# Patient Record
Sex: MALE | Race: WHITE | NOT HISPANIC OR LATINO | Employment: FULL TIME | URBAN - NONMETROPOLITAN AREA
[De-identification: names, ages, dates, MRNs, and addresses within clinical notes are randomized per-mention and may not be internally consistent; named-entity substitution may affect disease eponyms.]

---

## 2017-11-29 ENCOUNTER — OFFICE VISIT (OUTPATIENT)
Dept: INTERNAL MEDICINE | Facility: CLINIC | Age: 52
End: 2017-11-29

## 2017-11-29 VITALS
HEART RATE: 68 BPM | DIASTOLIC BLOOD PRESSURE: 80 MMHG | SYSTOLIC BLOOD PRESSURE: 120 MMHG | WEIGHT: 307 LBS | TEMPERATURE: 97.4 F | RESPIRATION RATE: 14 BRPM | OXYGEN SATURATION: 97 % | HEIGHT: 72 IN | BODY MASS INDEX: 41.58 KG/M2

## 2017-11-29 DIAGNOSIS — H69.91 DISORDER OF RIGHT EUSTACHIAN TUBE: Primary | ICD-10-CM

## 2017-11-29 DIAGNOSIS — H66.91 RIGHT OTITIS MEDIA, UNSPECIFIED OTITIS MEDIA TYPE: ICD-10-CM

## 2017-11-29 DIAGNOSIS — H93.11 TINNITUS OF RIGHT EAR: ICD-10-CM

## 2017-11-29 PROCEDURE — 99213 OFFICE O/P EST LOW 20 MIN: CPT | Performed by: INTERNAL MEDICINE

## 2017-11-29 RX ORDER — AZITHROMYCIN 250 MG/1
TABLET, FILM COATED ORAL
Qty: 6 TABLET | Refills: 0 | Status: SHIPPED | OUTPATIENT
Start: 2017-11-29 | End: 2019-06-17

## 2017-11-29 RX ORDER — FLUTICASONE PROPIONATE 50 MCG
2 SPRAY, SUSPENSION (ML) NASAL DAILY
Qty: 1 BOTTLE | Refills: 1 | Status: SHIPPED | OUTPATIENT
Start: 2017-11-29 | End: 2019-06-17

## 2017-11-29 RX ORDER — CARVEDILOL 12.5 MG/1
TABLET ORAL
COMMUNITY
Start: 2014-10-16 | End: 2019-07-22

## 2017-11-29 RX ORDER — PREDNISONE 20 MG/1
20 TABLET ORAL DAILY
Qty: 10 TABLET | Refills: 0 | Status: SHIPPED | OUTPATIENT
Start: 2017-11-29 | End: 2019-06-17

## 2017-11-29 RX ORDER — AMLODIPINE BESYLATE AND BENAZEPRIL HYDROCHLORIDE 10; 40 MG/1; MG/1
CAPSULE ORAL
COMMUNITY
Start: 2017-11-22 | End: 2019-06-17 | Stop reason: SDUPTHER

## 2017-11-29 NOTE — PROGRESS NOTES
Subjective   Samantha Silva is a 52 y.o. male.     Chief Complaint   Patient presents with   • Earache     right ear pain - ringing in ears as well       Earache    There is pain in the right ear. This is a new problem. The current episode started in the past 7 days. The problem occurs constantly. The problem has been unchanged. There has been no fever. The pain is mild. Associated symptoms include a sore throat. Pertinent negatives include no coughing, ear discharge or rhinorrhea. Associated symptoms comments: Ringing in ear. He has tried NSAIDs for the symptoms. The treatment provided no relief.          Current Outpatient Prescriptions:   •  amLODIPine-benazepril (LOTREL) 10-40 MG per capsule, , Disp: , Rfl:   •  carvedilol (COREG) 12.5 MG tablet, Take  by mouth., Disp: , Rfl:   •  azithromycin (ZITHROMAX) 250 MG tablet, Take 2 tablets the first day, then 1 tablet daily for 4 days., Disp: 6 tablet, Rfl: 0  •  fluticasone (FLONASE) 50 MCG/ACT nasal spray, 2 sprays into each nostril Daily., Disp: 1 bottle, Rfl: 1  •  predniSONE (DELTASONE) 20 MG tablet, Take 1 tablet by mouth Daily., Disp: 10 tablet, Rfl: 0    The following portions of the patient's history were reviewed and updated as appropriate: allergies, current medications, past family history, past medical history, past social history, past surgical history and problem list.    Review of Systems   Constitutional: Negative.    HENT: Positive for ear pain and sore throat. Negative for ear discharge and rhinorrhea.    Respiratory: Negative.  Negative for cough.    Cardiovascular: Negative.    Gastrointestinal: Negative.    Skin: Negative.    Psychiatric/Behavioral: Negative.        Objective   Physical Exam   Constitutional: He is oriented to person, place, and time. He appears well-developed and well-nourished.   Neck: Neck supple.   Cardiovascular: Normal rate, regular rhythm and normal heart sounds.    Pulmonary/Chest: Effort normal and breath sounds  normal.   Abdominal: Soft. Bowel sounds are normal.   Neurological: He is alert and oriented to person, place, and time.   Psychiatric: He has a normal mood and affect. His behavior is normal.       All tests have been reviewed.    Assessment/Plan   Diagnoses and all orders for this visit:    Disorder of right eustachian tube    OM    Tinnitus of right ear    -     predniSONE (DELTASONE) 20 MG tablet; Take 1 tablet by mouth Daily.  -     fluticasone (FLONASE) 50 MCG/ACT nasal spray; 2 sprays into each nostril Daily.  -     azithromycin (ZITHROMAX) 250 MG tablet; Take 2 tablets the first day, then 1 tablet daily for 4 days.           zyrtec and advil

## 2019-06-17 ENCOUNTER — OFFICE VISIT (OUTPATIENT)
Dept: INTERNAL MEDICINE | Facility: CLINIC | Age: 54
End: 2019-06-17

## 2019-06-17 VITALS
HEIGHT: 70 IN | WEIGHT: 250 LBS | HEART RATE: 68 BPM | OXYGEN SATURATION: 97 % | TEMPERATURE: 97.9 F | SYSTOLIC BLOOD PRESSURE: 116 MMHG | DIASTOLIC BLOOD PRESSURE: 80 MMHG | BODY MASS INDEX: 35.79 KG/M2

## 2019-06-17 DIAGNOSIS — E55.9 VITAMIN D DEFICIENCY: ICD-10-CM

## 2019-06-17 DIAGNOSIS — R63.4 WEIGHT LOSS: ICD-10-CM

## 2019-06-17 DIAGNOSIS — E66.9 CLASS 2 OBESITY WITHOUT SERIOUS COMORBIDITY WITH BODY MASS INDEX (BMI) OF 35.0 TO 35.9 IN ADULT, UNSPECIFIED OBESITY TYPE: ICD-10-CM

## 2019-06-17 DIAGNOSIS — R35.1 NOCTURIA: ICD-10-CM

## 2019-06-17 DIAGNOSIS — Z12.11 COLON CANCER SCREENING: ICD-10-CM

## 2019-06-17 DIAGNOSIS — I10 ESSENTIAL HYPERTENSION: Primary | ICD-10-CM

## 2019-06-17 DIAGNOSIS — G47.30 SLEEP APNEA, UNSPECIFIED TYPE: ICD-10-CM

## 2019-06-17 PROBLEM — H69.91 DISORDER OF RIGHT EUSTACHIAN TUBE: Status: RESOLVED | Noted: 2017-11-29 | Resolved: 2019-06-17

## 2019-06-17 PROBLEM — H66.91 RIGHT OTITIS MEDIA: Status: RESOLVED | Noted: 2017-11-29 | Resolved: 2019-06-17

## 2019-06-17 PROBLEM — H93.11 TINNITUS OF RIGHT EAR: Status: RESOLVED | Noted: 2017-11-29 | Resolved: 2019-06-17

## 2019-06-17 PROCEDURE — 99214 OFFICE O/P EST MOD 30 MIN: CPT | Performed by: INTERNAL MEDICINE

## 2019-06-17 RX ORDER — AMLODIPINE BESYLATE AND BENAZEPRIL HYDROCHLORIDE 10; 40 MG/1; MG/1
1 CAPSULE ORAL DAILY
Qty: 90 CAPSULE | Refills: 3 | Status: SHIPPED | OUTPATIENT
Start: 2019-06-17 | End: 2020-07-09 | Stop reason: SDUPTHER

## 2019-06-17 NOTE — PROGRESS NOTES
Subjective   Samantha Silva is a 54 y.o. male.     Chief Complaint   Patient presents with   • Follow-up     has not been seen since 2017       History of Present Illness   Patient here for follow-up of.  Recently lost 67 pound over 6 months patient is a blood pressure starting to be low.  Mild fatigue dizzy patient self discontinued after now carvedilol 12.5 mg.  Sleep apnea on CPAP patient wants to be seen by a sleep doctor again.  Patient is also due for colonoscopy.  History of a low vitamin D high cholesterol needs to be checked.    Current Outpatient Medications:   •  amLODIPine-benazepril (LOTREL) 10-40 MG per capsule, Take 1 capsule by mouth Daily., Disp: 90 capsule, Rfl: 3  •  carvedilol (COREG) 12.5 MG tablet, Take  by mouth., Disp: , Rfl:     The following portions of the patient's history were reviewed and updated as appropriate: allergies, current medications, past family history, past medical history, past social history, past surgical history and problem list.    Review of Systems   Constitutional: Negative.    Respiratory: Negative.    Cardiovascular: Negative.    Gastrointestinal: Negative.    Musculoskeletal: Negative.    Skin: Negative.    Neurological: Negative.    Psychiatric/Behavioral: Negative.        Objective   Physical Exam   Constitutional: He is oriented to person, place, and time. He appears well-developed and well-nourished.   Neck: Neck supple.   Cardiovascular: Normal rate, regular rhythm and normal heart sounds.   Pulmonary/Chest: Effort normal and breath sounds normal.   Abdominal: Bowel sounds are normal.   Neurological: He is alert and oriented to person, place, and time.   Skin: Skin is warm.   Psychiatric: He has a normal mood and affect.       All tests have been reviewed.    Assessment/Plan   Diagnoses and all orders for this visit:    Essential hypertension wean off coreg , continue lotrel   Weight loss 67Ib on purpose, diet continue and do labs  Class 2 obesity without  serious comorbidity with body mass index (BMI) of 35.0 to 35.9 in adult, unspecified obesity type  Colon ca screen do scope  Sleep apnea on cpap okay ICD 3 weeks before you come back to do all the blood tests, unspecified type ref to pulm   Hx of Lumbar disc herniation doing well  Dyslipidemia do lab  vitD low,  do lab  [id number], tdap today  3 weeks physical  After labs

## 2019-07-22 ENCOUNTER — OFFICE VISIT (OUTPATIENT)
Dept: GASTROENTEROLOGY | Facility: CLINIC | Age: 54
End: 2019-07-22

## 2019-07-22 VITALS
BODY MASS INDEX: 36.79 KG/M2 | RESPIRATION RATE: 18 BRPM | HEART RATE: 72 BPM | HEIGHT: 70 IN | SYSTOLIC BLOOD PRESSURE: 126 MMHG | WEIGHT: 257 LBS | TEMPERATURE: 97.9 F | DIASTOLIC BLOOD PRESSURE: 72 MMHG

## 2019-07-22 DIAGNOSIS — Z12.11 COLON CANCER SCREENING: Primary | ICD-10-CM

## 2019-07-22 PROBLEM — M51.36 BULGE OF LUMBAR DISC WITHOUT MYELOPATHY: Status: ACTIVE | Noted: 2019-07-22

## 2019-07-22 PROCEDURE — S0285 CNSLT BEFORE SCREEN COLONOSC: HCPCS | Performed by: NURSE PRACTITIONER

## 2019-07-22 RX ORDER — SODIUM CHLORIDE 9 MG/ML
70 INJECTION, SOLUTION INTRAVENOUS CONTINUOUS PRN
Status: CANCELLED | OUTPATIENT
Start: 2019-07-22

## 2019-07-22 NOTE — PATIENT INSTRUCTIONS
1. The patient denies recent change in bowel habits. There is no diarrhea or constipation. There is no history of abdominal pain. There is no history of overt GI bleed (hematemesis melena or hematochezia). The patient denies nausea or vomiting. There is no history of reflux. The patient denies dysphagia or odynophagia. There is no history of recent significant weight loss. There is no history of liver disease in the past. There is no family history of colon cancer. The patient has not had a colonoscopy in the past.

## 2019-07-22 NOTE — PROGRESS NOTES
Chief Complaint   Patient presents with   • Establish Care     Colon Screen      History of Present Illness     The patient denies recent change in bowel habits. There is no diarrhea or constipation. There is no history of abdominal pain. There is no history of overt GI bleed (hematemesis melena or hematochezia). The patient denies nausea or vomiting. There is no history of reflux. The patient denies dysphagia or odynophagia. There is no history of recent significant weight loss. There is no history of liver disease in the past. There is no family history of colon cancer. The patient has not had a colonoscopy in the past.    Review of Systems   Constitutional: Negative for appetite change, chills, fatigue, fever and unexpected weight change.   HENT: Negative for mouth sores, nosebleeds and trouble swallowing.    Eyes: Negative for discharge and redness.   Respiratory: Negative for apnea, cough and shortness of breath.    Cardiovascular: Negative for chest pain, palpitations and leg swelling.   Gastrointestinal: Negative for abdominal distention, abdominal pain, anal bleeding, blood in stool, constipation, diarrhea, nausea and vomiting.   Endocrine: Negative for cold intolerance, heat intolerance and polydipsia.   Genitourinary: Negative for dysuria, hematuria and urgency.   Musculoskeletal: Negative for arthralgias, joint swelling and myalgias.   Skin: Negative for rash.   Allergic/Immunologic: Negative for food allergies and immunocompromised state.   Neurological: Negative for dizziness, seizures, syncope and headaches.   Hematological: Negative for adenopathy. Does not bruise/bleed easily.   Psychiatric/Behavioral: Negative for dysphoric mood. The patient is not nervous/anxious and is not hyperactive.      Patient Active Problem List   Diagnosis   • Essential hypertension   • Class 2 obesity without serious comorbidity with body mass index (BMI) of 35.0 to 35.9 in adult   • Sleep apnea   • Bulge of lumbar disc  "without myelopathy     Past Medical History:   Diagnosis Date   • CPAP (continuous positive airway pressure) dependence    • Hypertension    • Sleep apnea      Past Surgical History:   Procedure Laterality Date   • LAPAROSCOPIC GASTRIC BANDING     • SPINE SURGERY       Family History   Problem Relation Age of Onset   • Heart attack Brother    • Colon cancer Neg Hx    • Liver disease Neg Hx      Social History     Tobacco Use   • Smoking status: Former Smoker     Last attempt to quit:      Years since quittin.5   • Smokeless tobacco: Never Used   Substance Use Topics   • Alcohol use: Yes     Comment: social        Current Outpatient Medications:   •  amLODIPine-benazepril (LOTREL) 10-40 MG per capsule, Take 1 capsule by mouth Daily., Disp: 90 capsule, Rfl: 3    No Known Allergies    Blood pressure 126/72, pulse 72, temperature 97.9 °F (36.6 °C), resp. rate 18, height 177.8 cm (70\"), weight 117 kg (257 lb).    Physical Exam   Constitutional: He is oriented to person, place, and time. He appears well-developed and well-nourished. No distress.   HENT:   Head: Normocephalic and atraumatic.   Right Ear: Hearing and external ear normal.   Left Ear: Hearing and external ear normal.   Nose: Nose normal.   Mouth/Throat: Oropharynx is clear and moist and mucous membranes are normal. Mucous membranes are not pale, not dry and not cyanotic. No oral lesions. No oropharyngeal exudate.   Eyes: Conjunctivae and EOM are normal. Right eye exhibits no discharge. Left eye exhibits no discharge.   Neck: Trachea normal. Neck supple. No JVD present. No edema present. No thyroid mass and no thyromegaly present.   Cardiovascular: Normal rate, regular rhythm, S2 normal and normal heart sounds. Exam reveals no gallop, no S3 and no friction rub.   No murmur heard.  Pulmonary/Chest: Effort normal and breath sounds normal. No respiratory distress. He exhibits no tenderness.   Abdominal: Normal appearance and bowel sounds are " normal. He exhibits no distension, no ascites and no mass. There is no splenomegaly or hepatomegaly. There is no tenderness. There is no rigidity, no rebound and no guarding. No hernia.     Vascular Status -  His right foot exhibits no edema. His left foot exhibits no edema.  Lymphadenopathy:     He has no cervical adenopathy.        Left: No supraclavicular adenopathy present.   Neurological: He is alert and oriented to person, place, and time. He has normal strength. No cranial nerve deficit or sensory deficit.   Skin: No rash noted. He is not diaphoretic. No cyanosis. No pallor. Nails show no clubbing.   Psychiatric: He has a normal mood and affect.   Nursing note and vitals reviewed.  Stigmata of chronic liver disease:  None.  Asterixis:  None.    Assessment:      ICD-10-CM ICD-9-CM   1. Colon cancer screening Z12.11 V76.51       Plan/  Patient Instructions   1. The patient denies recent change in bowel habits. There is no diarrhea or constipation. There is no history of abdominal pain. There is no history of overt GI bleed (hematemesis melena or hematochezia). The patient denies nausea or vomiting. There is no history of reflux. The patient denies dysphagia or odynophagia. There is no history of recent significant weight loss. There is no history of liver disease in the past. There is no family history of colon cancer. The patient has not had a colonoscopy in the past.     Yanelis Cuevas, APRN

## 2019-07-23 PROBLEM — Z12.11 COLON CANCER SCREENING: Status: ACTIVE | Noted: 2019-07-23

## 2019-08-27 ENCOUNTER — OFFICE VISIT (OUTPATIENT)
Dept: PULMONOLOGY | Facility: CLINIC | Age: 54
End: 2019-08-27

## 2019-08-27 VITALS
SYSTOLIC BLOOD PRESSURE: 122 MMHG | BODY MASS INDEX: 35.21 KG/M2 | HEIGHT: 72 IN | OXYGEN SATURATION: 98 % | WEIGHT: 260 LBS | HEART RATE: 72 BPM | RESPIRATION RATE: 16 BRPM | DIASTOLIC BLOOD PRESSURE: 72 MMHG

## 2019-08-27 DIAGNOSIS — E66.9 OBESITY (BMI 30-39.9): ICD-10-CM

## 2019-08-27 DIAGNOSIS — G47.33 OBSTRUCTIVE SLEEP APNEA: Primary | ICD-10-CM

## 2019-08-27 PROCEDURE — 99244 OFF/OP CNSLTJ NEW/EST MOD 40: CPT | Performed by: INTERNAL MEDICINE

## 2019-08-27 NOTE — PROGRESS NOTES
CONSULT NOTE    Requested by:   Xu Garza MD      Chief Complaint   Patient presents with   • Consult   • Sleeping Problem       Subjective:  Samantha Silva is a 54 y.o. male.     History of Present Illness   Patient comes in today for consultation because of sleep apnea.  The patient says that  he was diagnosed with sleep apnea 12 years ago.  It appears that he has been on a PAP device since then.    Patient does report improvement but occasionally feels that he is feeling tired in the morning and occasionally has noticed excessive daytime sleepiness as well.  The patient denies headaches in the mornings.     Patient is not aware of snoring through the device.     Patient says that the compliance with the use of the equipment is good and he reports some issues with the mask such as leaks etc.     he however, does not feel that all his symptoms from before the diagnosis being established have returned although he is noticing renewed tendency to feel sleepy while watching TV.    He still drinks 10 servings of coffee a day. He also has multiple family members with SANA.     The following portions of the patient's history were reviewed and updated as appropriate: allergies, current medications, past family history, past medical history, past social history and past surgical history.    Review of Systems   Constitutional: Negative for chills, fatigue and fever.   HENT: Negative for sinus pressure, sneezing and sore throat.    Respiratory: Negative for cough, chest tightness, shortness of breath and wheezing.    Cardiovascular: Negative for palpitations and leg swelling.   Psychiatric/Behavioral: Positive for sleep disturbance.   All other systems reviewed and are negative.      Past Medical History:   Diagnosis Date   • CPAP (continuous positive airway pressure) dependence    • History of echocardiogram    • History of stress test    • Hypertension 2005   • Obesity    • Sleep apnea 2008   • Wears glasses   "      Social History     Tobacco Use   • Smoking status: Former Smoker     Last attempt to quit:      Years since quittin.6   • Smokeless tobacco: Never Used   Substance Use Topics   • Alcohol use: No     Frequency: Never     Comment: social          Objective:  Visit Vitals  /72   Pulse 72   Resp 16   Ht 182.9 cm (72.01\")   Wt 118 kg (260 lb)   SpO2 98%   BMI 35.25 kg/m²       Physical Exam   Constitutional: He is oriented to person, place, and time. He appears well-developed and well-nourished.   HENT:   Head: Atraumatic.   Crowded Oropharynx.    Eyes: EOM are normal. Pupils are equal, round, and reactive to light.   Neck: No JVD present. No tracheal deviation present. No thyromegaly present.   Increased adipose tissue.    Cardiovascular: Normal rate and regular rhythm.   Pulmonary/Chest: Effort normal and breath sounds normal. No respiratory distress. He has no wheezes.   Musculoskeletal: Normal range of motion. He exhibits no edema.   Gait was normal.   Neurological: He is alert and oriented to person, place, and time.   Skin: Skin is warm and dry.   Psychiatric: He has a normal mood and affect. His behavior is normal.   Vitals reviewed.      Assessment/Plan:  Samantha was seen today for consult and sleeping problem.    Diagnoses and all orders for this visit:    Obstructive sleep apnea  -     BIPAP / CPAP Adjustment    Obesity (BMI 30-39.9)  -     BIPAP / CPAP Adjustment        Return in about 3 months (around 2019) for [facility].    DISCUSSION(if any):  Laboratory workup was also reviewed which showed   Lab Results   Component Value Date    CO2 28 10/16/2014     ===========================  ===========================    We will try to obtain his last sleep study results from the performing facility, if not already scanned in our system.    I told the patient that his symptoms are consistent with partially controlled sleep apnea.    Since his last sleep study was more than 10 years " ago, I have offered him the choice of a trial of AutoPap. He will definitely need a new device.     If the symptoms do not improve, even after trying the AutoPap empirically, the patient may have to undergo a full night titration study or a split study.    Patient was advised to continue using PAP for at least 4 hours every night    Patient was advised to call this office with any issues.    Since the patient is an avid traveler and goes on long bike trips, I told him that he can ask the DME company to provide him with a travel CPAP.    The patient says that he is willing to pay out-of-pocket for the portable travel CPAP.    Weight loss was also discussed and advised.    He was advised to cut back on his caffeine.     Dictated utilizing Dragon dictation.    This document was electronically signed by Rachna Scott MD on 08/27/19 at 10:40 AM

## 2019-08-28 ENCOUNTER — ANESTHESIA (OUTPATIENT)
Dept: GASTROENTEROLOGY | Facility: HOSPITAL | Age: 54
End: 2019-08-28

## 2019-08-28 ENCOUNTER — ANESTHESIA EVENT (OUTPATIENT)
Dept: GASTROENTEROLOGY | Facility: HOSPITAL | Age: 54
End: 2019-08-28

## 2019-08-28 ENCOUNTER — HOSPITAL ENCOUNTER (OUTPATIENT)
Facility: HOSPITAL | Age: 54
Setting detail: HOSPITAL OUTPATIENT SURGERY
Discharge: HOME | End: 2019-08-28
Attending: INTERNAL MEDICINE | Admitting: INTERNAL MEDICINE

## 2019-08-28 VITALS
HEIGHT: 72 IN | OXYGEN SATURATION: 99 % | SYSTOLIC BLOOD PRESSURE: 112 MMHG | WEIGHT: 257 LBS | TEMPERATURE: 97.8 F | HEART RATE: 59 BPM | RESPIRATION RATE: 18 BRPM | BODY MASS INDEX: 34.81 KG/M2 | DIASTOLIC BLOOD PRESSURE: 72 MMHG

## 2019-08-28 DIAGNOSIS — Z12.11 COLON CANCER SCREENING: ICD-10-CM

## 2019-08-28 PROCEDURE — 25010000002 PROPOFOL 1000 MG/100ML EMULSION: Performed by: NURSE ANESTHETIST, CERTIFIED REGISTERED

## 2019-08-28 PROCEDURE — S0260 H&P FOR SURGERY: HCPCS | Performed by: INTERNAL MEDICINE

## 2019-08-28 PROCEDURE — 25010000002 FENTANYL CITRATE (PF) 100 MCG/2ML SOLUTION: Performed by: NURSE ANESTHETIST, CERTIFIED REGISTERED

## 2019-08-28 PROCEDURE — 25010000002 PROPOFOL 1000 MG/ML EMULSION: Performed by: NURSE ANESTHETIST, CERTIFIED REGISTERED

## 2019-08-28 PROCEDURE — 45380 COLONOSCOPY AND BIOPSY: CPT | Performed by: INTERNAL MEDICINE

## 2019-08-28 RX ORDER — SODIUM CHLORIDE 0.9 % (FLUSH) 0.9 %
10 SYRINGE (ML) INJECTION AS NEEDED
Status: DISCONTINUED | OUTPATIENT
Start: 2019-08-28 | End: 2019-08-28 | Stop reason: HOSPADM

## 2019-08-28 RX ORDER — PROPOFOL 10 MG/ML
INJECTION, EMULSION INTRAVENOUS AS NEEDED
Status: DISCONTINUED | OUTPATIENT
Start: 2019-08-28 | End: 2019-08-28 | Stop reason: SURG

## 2019-08-28 RX ORDER — FENTANYL CITRATE 50 UG/ML
INJECTION, SOLUTION INTRAMUSCULAR; INTRAVENOUS AS NEEDED
Status: DISCONTINUED | OUTPATIENT
Start: 2019-08-28 | End: 2019-08-28 | Stop reason: SURG

## 2019-08-28 RX ORDER — SODIUM CHLORIDE 9 MG/ML
70 INJECTION, SOLUTION INTRAVENOUS CONTINUOUS PRN
Status: DISCONTINUED | OUTPATIENT
Start: 2019-08-28 | End: 2019-08-28 | Stop reason: HOSPADM

## 2019-08-28 RX ADMIN — FENTANYL CITRATE 100 MCG: 50 INJECTION, SOLUTION INTRAMUSCULAR; INTRAVENOUS at 09:00

## 2019-08-28 RX ADMIN — SODIUM CHLORIDE 70 ML/HR: 9 INJECTION, SOLUTION INTRAVENOUS at 08:00

## 2019-08-28 RX ADMIN — PROPOFOL 50 MG: 10 INJECTION, EMULSION INTRAVENOUS at 09:00

## 2019-08-28 RX ADMIN — PROPOFOL 140 MCG/KG/MIN: 10 INJECTION, EMULSION INTRAVENOUS at 09:00

## 2019-08-28 NOTE — ANESTHESIA PREPROCEDURE EVALUATION
Anesthesia Evaluation     NPO Solid Status: > 8 hours  NPO Liquid Status: > 8 hours           Airway   Mallampati: II  TM distance: >3 FB  Neck ROM: full  No difficulty expected  Dental - normal exam     Pulmonary - normal exam   (+) sleep apnea on CPAP,   Cardiovascular - normal exam    (+) hypertension well controlled less than 2 medications,       Neuro/Psych  GI/Hepatic/Renal/Endo    (+) obesity, morbid obesity,      Musculoskeletal     Abdominal  - normal exam   Substance History      OB/GYN          Other                        Anesthesia Plan    ASA 3     MAC     intravenous induction   Anesthetic plan, all risks, benefits, and alternatives have been provided, discussed and informed consent has been obtained with: patient.    Plan discussed with CRNA.

## 2019-08-28 NOTE — ANESTHESIA POSTPROCEDURE EVALUATION
Patient: Samantha Simmonschurch    Procedure Summary     Date:  08/28/19 Room / Location:  [facility] ENDOSCOPY 2 / [facility] ENDOSCOPY    Anesthesia Start:  0902 Anesthesia Stop:  1014    Procedure:  COLONOSCOPY WITH COLD FORCEP POLYPECTOMY (N/A Anus) Diagnosis:       Colon cancer screening      (Colon cancer screening [Z12.11])    Surgeon:  Дмитрий Jasso MD Provider:  Fco Fischer CRNA    Anesthesia Type:  MAC ASA Status:  3          Anesthesia Type: MAC  Last vitals  BP   97/60 (08/28/19 1015)   Temp   97.8 °F (36.6 °C) (08/28/19 1015)   Pulse   56 (08/28/19 1015)   Resp   16 (08/28/19 1015)     SpO2   96 % (08/28/19 1015)     Anesthesia Post Evaluation

## 2019-09-04 LAB
LAB AP CASE REPORT: NORMAL
PATH REPORT.FINAL DX SPEC: NORMAL

## 2019-11-27 ENCOUNTER — OFFICE VISIT (OUTPATIENT)
Dept: PULMONOLOGY | Facility: CLINIC | Age: 54
End: 2019-11-27

## 2019-11-27 VITALS
DIASTOLIC BLOOD PRESSURE: 70 MMHG | OXYGEN SATURATION: 94 % | WEIGHT: 262 LBS | BODY MASS INDEX: 35.49 KG/M2 | RESPIRATION RATE: 18 BRPM | HEIGHT: 72 IN | SYSTOLIC BLOOD PRESSURE: 130 MMHG | HEART RATE: 70 BPM

## 2019-11-27 DIAGNOSIS — E66.9 OBESITY (BMI 30-39.9): ICD-10-CM

## 2019-11-27 DIAGNOSIS — G47.33 OBSTRUCTIVE SLEEP APNEA: Primary | ICD-10-CM

## 2019-11-27 PROCEDURE — 99212 OFFICE O/P EST SF 10 MIN: CPT | Performed by: NURSE PRACTITIONER

## 2020-04-20 RX ORDER — AMLODIPINE BESYLATE AND BENAZEPRIL HYDROCHLORIDE 10; 40 MG/1; MG/1
CAPSULE ORAL
Qty: 90 CAPSULE | Refills: 0 | OUTPATIENT
Start: 2020-04-20

## 2020-07-06 ENCOUNTER — TELEPHONE (OUTPATIENT)
Dept: INTERNAL MEDICINE | Facility: CLINIC | Age: 55
End: 2020-07-06

## 2020-07-06 RX ORDER — AMLODIPINE BESYLATE AND BENAZEPRIL HYDROCHLORIDE 10; 40 MG/1; MG/1
1 CAPSULE ORAL DAILY
Qty: 90 CAPSULE | Refills: 3 | OUTPATIENT
Start: 2020-07-06

## 2020-07-06 NOTE — TELEPHONE ENCOUNTER
RX CALLED FOR A REFILL ON THE FOLLOWING: PATIENT IS OUT AND WOULD LIKE TO GET IT TONIGHT:    amLODIPine-benazepril (LOTREL) 10-40 MG per capsule 90 capsule 3      Sig - Route: Take 1 capsule by mouth Daily. - Oral            Pharmacy     [id number] - [place], [place] - [address] - [phone] [facility] [phone] FX

## 2020-07-09 RX ORDER — AMLODIPINE BESYLATE AND BENAZEPRIL HYDROCHLORIDE 10; 40 MG/1; MG/1
1 CAPSULE ORAL DAILY
Qty: 30 CAPSULE | Refills: 0 | Status: SHIPPED | OUTPATIENT
Start: 2020-07-09 | End: 2020-08-07 | Stop reason: SDUPTHER

## 2020-07-09 NOTE — TELEPHONE ENCOUNTER
PATIENT CALLING IN TO CHECK ON THE REFILL REQUEST FOR:  amLODIPine-benazepril (LOTREL) 10-40 MG per capsule    I ADVISED THE PATIENT THE DR WANTS TO SEE HIM FIRST AND WE SCHEDULED AN APPOINTMENT FOR 08/07 .    PLEASE SEND AN EMERGENCY REFILL UNTIL THE APPOINTMENT AS THE PATIENT IS NOW OUT OF MEDICATION.    [id number] - [place], [place] - [address] - [phone]  - [phone]

## 2020-08-07 ENCOUNTER — TELEMEDICINE (OUTPATIENT)
Dept: INTERNAL MEDICINE | Facility: CLINIC | Age: 55
End: 2020-08-07

## 2020-08-07 DIAGNOSIS — I10 ESSENTIAL HYPERTENSION: Primary | ICD-10-CM

## 2020-08-07 DIAGNOSIS — E66.9 CLASS 2 OBESITY WITHOUT SERIOUS COMORBIDITY WITH BODY MASS INDEX (BMI) OF 35.0 TO 35.9 IN ADULT, UNSPECIFIED OBESITY TYPE: ICD-10-CM

## 2020-08-07 DIAGNOSIS — K64.9 HEMORRHOIDS, UNSPECIFIED HEMORRHOID TYPE: ICD-10-CM

## 2020-08-07 DIAGNOSIS — G47.30 SLEEP APNEA, UNSPECIFIED TYPE: ICD-10-CM

## 2020-08-07 DIAGNOSIS — M51.36 BULGE OF LUMBAR DISC WITHOUT MYELOPATHY: ICD-10-CM

## 2020-08-07 DIAGNOSIS — Z00.00 ANNUAL PHYSICAL EXAM: ICD-10-CM

## 2020-08-07 DIAGNOSIS — E55.9 VITAMIN D DEFICIENCY: ICD-10-CM

## 2020-08-07 PROBLEM — Z12.11 COLON CANCER SCREENING: Status: RESOLVED | Noted: 2019-07-23 | Resolved: 2020-08-07

## 2020-08-07 PROCEDURE — 99214 OFFICE O/P EST MOD 30 MIN: CPT | Performed by: INTERNAL MEDICINE

## 2020-08-07 RX ORDER — AMLODIPINE BESYLATE AND BENAZEPRIL HYDROCHLORIDE 10; 40 MG/1; MG/1
1 CAPSULE ORAL DAILY
Qty: 90 CAPSULE | Refills: 3 | Status: SHIPPED | OUTPATIENT
Start: 2020-08-07 | End: 2021-04-14 | Stop reason: SDUPTHER

## 2020-08-07 RX ORDER — HYDROCORTISONE ACETATE 25 MG/1
25 SUPPOSITORY RECTAL 2 TIMES DAILY PRN
Qty: 24 SUPPOSITORY | Refills: 3 | Status: SHIPPED | OUTPATIENT
Start: 2020-08-07 | End: 2020-10-06

## 2020-08-07 NOTE — PROGRESS NOTES
Subjective   Samantha Silva is a 55 y.o. male.     No chief complaint on file.      History of Present Illness   Patient here for follow-up.  Blood pressure stable on medication.  Weight gain 15 pounds per patient.  Colonoscopy showed diverticulosis colon polyps.  Sleep apnea stable on CPAP.  Low back pain resolved.  Dyslipidemia need a blood tests.  Patient also complains of a hemorrhoid flareup no blood.    Current Outpatient Medications:   •  amLODIPine-benazepril (LOTREL) 10-40 MG per capsule, Take 1 capsule by mouth Daily., Disp: 90 capsule, Rfl: 3  •  hydrocortisone (ANUSOL-HC) 25 MG suppository, Insert 1 suppository into the rectum 2 (Two) Times a Day As Needed for Hemorrhoids., Disp: 24 suppository, Rfl: 3    The following portions of the patient's history were reviewed and updated as appropriate: allergies, current medications, past family history, past medical history, past social history, past surgical history and problem list.    Review of Systems   Constitutional: Negative.    Respiratory: Negative.    Cardiovascular: Negative.    Gastrointestinal: Negative.         Hemorrhoid   Musculoskeletal: Negative.    Skin: Negative.    Neurological: Negative.    Psychiatric/Behavioral: Negative.        Objective   Physical Exam   Constitutional: He is oriented to person, place, and time. He appears well-developed and well-nourished.   Pulmonary/Chest: Effort normal.   Neurological: He is alert and oriented to person, place, and time.   Psychiatric: He has a normal mood and affect.       All tests have been reviewed.    Assessment/Plan   Diagnoses and all orders for this visit:    Essential hypertension continue medication  Continue  Sleep apnea, unspecified type continue CPAP    Class 2 obesity without serious comorbidity with body mass index (BMI) of 35.0 to 35.9 in adult, unspecified obesity type need him weight loss    Vitamin D deficiency  -     Vitamin D 25 Hydroxy    Annual physical exam  -     CBC &  Differential  -     Comprehensive Metabolic Panel  -     Lipid Panel  -     TSH  -     PSA DIAGNOSTIC    Hemorrhoids, unspecified hemorrhoid type initiate medication    Other orders  -     amLODIPine-benazepril (LOTREL) 10-40 MG per capsule; Take 1 capsule by mouth Daily.  -     hydrocortisone (ANUSOL-HC) 25 MG suppository; Insert 1 suppository into the rectum 2 (Two) Times a Day As Needed for Hemorrhoids.    Patient to schedule physical after blood tests         ----Below is to historical records for reference only:    Colon 8/28/19 repeat 3 years  Sleep apnea on cpap   Hx of Lumbar disc herniation doing well  vitD low,  do lab  [id number], tdap due

## 2020-10-06 ENCOUNTER — OFFICE VISIT (OUTPATIENT)
Dept: INTERNAL MEDICINE | Facility: CLINIC | Age: 55
End: 2020-10-06

## 2020-10-06 VITALS
WEIGHT: 280.4 LBS | OXYGEN SATURATION: 96 % | HEIGHT: 72 IN | HEART RATE: 74 BPM | SYSTOLIC BLOOD PRESSURE: 132 MMHG | BODY MASS INDEX: 37.98 KG/M2 | TEMPERATURE: 97.8 F | DIASTOLIC BLOOD PRESSURE: 84 MMHG

## 2020-10-06 DIAGNOSIS — G89.29 CHRONIC PAIN OF LEFT KNEE: Primary | ICD-10-CM

## 2020-10-06 DIAGNOSIS — M25.562 CHRONIC PAIN OF LEFT KNEE: Primary | ICD-10-CM

## 2020-10-06 LAB
25(OH)D3+25(OH)D2 SERPL-MCNC: 37.2 NG/ML (ref 30–100)
ALBUMIN SERPL-MCNC: 4.6 G/DL (ref 3.5–5.2)
ALBUMIN/GLOB SERPL: 2 G/DL
ALP SERPL-CCNC: 84 U/L (ref 39–117)
ALT SERPL-CCNC: 31 U/L (ref 1–41)
AST SERPL-CCNC: 24 U/L (ref 1–40)
BASOPHILS # BLD AUTO: 0.02 10*3/MM3 (ref 0–0.2)
BASOPHILS NFR BLD AUTO: 0.4 % (ref 0–1.5)
BILIRUB SERPL-MCNC: 0.5 MG/DL (ref 0–1.2)
BUN SERPL-MCNC: 11 MG/DL (ref 6–20)
BUN/CREAT SERPL: 10.2 (ref 7–25)
CALCIUM SERPL-MCNC: 9.3 MG/DL (ref 8.6–10.5)
CHLORIDE SERPL-SCNC: 106 MMOL/L (ref 98–107)
CHOLEST SERPL-MCNC: 188 MG/DL (ref 0–200)
CO2 SERPL-SCNC: 26.1 MMOL/L (ref 22–29)
CREAT SERPL-MCNC: 1.08 MG/DL (ref 0.76–1.27)
EOSINOPHIL # BLD AUTO: 0.06 10*3/MM3 (ref 0–0.4)
EOSINOPHIL NFR BLD AUTO: 1.1 % (ref 0.3–6.2)
ERYTHROCYTE [DISTWIDTH] IN BLOOD BY AUTOMATED COUNT: 12.2 % (ref 12.3–15.4)
GLOBULIN SER CALC-MCNC: 2.3 GM/DL
GLUCOSE SERPL-MCNC: 90 MG/DL (ref 65–99)
HCT VFR BLD AUTO: 47.3 % (ref 37.5–51)
HDLC SERPL-MCNC: 35 MG/DL (ref 40–60)
HGB BLD-MCNC: 15.9 G/DL (ref 13–17.7)
IMM GRANULOCYTES # BLD AUTO: 0.02 10*3/MM3 (ref 0–0.05)
IMM GRANULOCYTES NFR BLD AUTO: 0.4 % (ref 0–0.5)
LDLC SERPL CALC-MCNC: 122 MG/DL (ref 0–100)
LYMPHOCYTES # BLD AUTO: 1.13 10*3/MM3 (ref 0.7–3.1)
LYMPHOCYTES NFR BLD AUTO: 20.3 % (ref 19.6–45.3)
MCH RBC QN AUTO: 31.5 PG (ref 26.6–33)
MCHC RBC AUTO-ENTMCNC: 33.6 G/DL (ref 31.5–35.7)
MCV RBC AUTO: 93.7 FL (ref 79–97)
MONOCYTES # BLD AUTO: 0.4 10*3/MM3 (ref 0.1–0.9)
MONOCYTES NFR BLD AUTO: 7.2 % (ref 5–12)
NEUTROPHILS # BLD AUTO: 3.95 10*3/MM3 (ref 1.7–7)
NEUTROPHILS NFR BLD AUTO: 70.6 % (ref 42.7–76)
NRBC BLD AUTO-RTO: 0 /100 WBC (ref 0–0.2)
PLATELET # BLD AUTO: 253 10*3/MM3 (ref 140–450)
POTASSIUM SERPL-SCNC: 4.9 MMOL/L (ref 3.5–5.2)
PROT SERPL-MCNC: 6.9 G/DL (ref 6–8.5)
PSA SERPL-MCNC: 0.69 NG/ML (ref 0–4)
RBC # BLD AUTO: 5.05 10*6/MM3 (ref 4.14–5.8)
SODIUM SERPL-SCNC: 142 MMOL/L (ref 136–145)
TRIGL SERPL-MCNC: 155 MG/DL (ref 0–150)
TSH SERPL DL<=0.005 MIU/L-ACNC: 1.63 UIU/ML (ref 0.27–4.2)
VLDLC SERPL CALC-MCNC: 31 MG/DL
WBC # BLD AUTO: 5.58 10*3/MM3 (ref 3.4–10.8)

## 2020-10-06 PROCEDURE — 99213 OFFICE O/P EST LOW 20 MIN: CPT | Performed by: INTERNAL MEDICINE

## 2020-10-06 NOTE — PROGRESS NOTES
Subjective   Samantha Silva is a 55 y.o. male.     Chief Complaint   Patient presents with   • Knee Pain     onset month ago, pt c/o sharp shooting pain in his left knee, pt states eases up with ibuprofen and brace        History of Present Illness   Chronic and gradually worse , this weekend after working in the yard pain again worse, lateral aspect tender and weight bearing worse, pain is aching in nature.  Sitting better. Ibuprofen and brace and elevation helps.     Current Outpatient Medications:   •  amLODIPine-benazepril (LOTREL) 10-40 MG per capsule, Take 1 capsule by mouth Daily., Disp: 90 capsule, Rfl: 3    The following portions of the patient's history were reviewed and updated as appropriate: allergies, current medications, past family history, past medical history, past social history, past surgical history and problem list.    Review of Systems   Constitutional: Negative.    Respiratory: Negative.    Cardiovascular: Negative.    Gastrointestinal: Negative.    Musculoskeletal: Positive for arthralgias.   Skin: Negative.    Neurological: Negative.    Psychiatric/Behavioral: Negative.        Objective   Physical Exam  Constitutional:       Appearance: Normal appearance. He is well-developed.   Neck:      Musculoskeletal: Neck supple.   Cardiovascular:      Rate and Rhythm: Normal rate and regular rhythm.      Heart sounds: Normal heart sounds.   Pulmonary:      Effort: Pulmonary effort is normal.      Breath sounds: Normal breath sounds.   Abdominal:      General: Bowel sounds are normal.      Palpations: Abdomen is soft.   Musculoskeletal: Normal range of motion.         General: Tenderness present.   Neurological:      Mental Status: He is alert and oriented to person, place, and time.   Psychiatric:         Behavior: Behavior normal.         All tests have been reviewed.    Assessment/Plan   Diagnoses and all orders for this visit:    Chronic pain of left knee  -     XR Knee 3 View Left  -      Ambulatory Referral to Physical Therapy Evaluate and treat    need to loose weight , weight gain 18 Ib       annual phys,

## 2020-10-15 ENCOUNTER — TREATMENT (OUTPATIENT)
Dept: PHYSICAL THERAPY | Facility: CLINIC | Age: 55
End: 2020-10-15

## 2020-10-15 DIAGNOSIS — G89.29 CHRONIC PAIN OF LEFT KNEE: Primary | ICD-10-CM

## 2020-10-15 DIAGNOSIS — M25.562 CHRONIC PAIN OF LEFT KNEE: Primary | ICD-10-CM

## 2020-10-15 PROCEDURE — 97162 PT EVAL MOD COMPLEX 30 MIN: CPT | Performed by: PHYSICAL THERAPIST

## 2020-10-15 PROCEDURE — 97110 THERAPEUTIC EXERCISES: CPT | Performed by: PHYSICAL THERAPIST

## 2020-10-15 PROCEDURE — 97140 MANUAL THERAPY 1/> REGIONS: CPT | Performed by: PHYSICAL THERAPIST

## 2020-10-15 NOTE — PROGRESS NOTES
Physical Therapy Initial Evaluation and Plan of Care  Patient: Samantha Silva   : 1965  Diagnosis/ICD-10 Code:  Chronic pain of left knee [M25.562, G89.29]  Referring practitioner: Xu Garza MD    Subjective Evaluation    History of Present Illness  Date of onset: 8/15/2020  Mechanism of injury: Insidious onset    Subjective comment: Had a recent onset of sharp pain in his left knee starting 3 months ago.  Symptoms improved slowly over the course of 2 months.  Had a recent flare up about 2 weeks ago that was very sharp in nature when walking.  Wore a knee sleeve that reduced his symptoms.  Has been feeling much better since last week.    Patient Occupation:  [employer] Quality of life: excellent    Pain  Current pain ratin  At best pain ratin  Quality: sharp  Relieving factors: rest (knee sleeve)  Exacerbated by: ambulating (was painful)  Progression: improved    Patient Goals  Patient goals for therapy: return to sport/leisure activities        Objective          Neurological Testing     Reflexes   Left   Patellar (L4): absent (0)  Achilles (S1): absent (0)    Right   Patellar (L4): absent (0)  Achilles (S1): absent (0)    Active Range of Motion   Left Knee   Flexion: 120 degrees   Extension: 0 degrees     Right Knee   Flexion: 120 degrees   Extension: 0 degrees     Strength/Myotome Testing     Left Knee   Flexion: 5  Extension: 5    Right Knee   Flexion: 5  Extension: 5      Assessment & Plan     Assessment  Assessment details: Mr. Silva is a 55 year old male that presents to physical therapy with c/o 2 month stent of L knee pain that was an insidious onset in nature.  PMH was covered during interview.  Knee mobility is equal and WNL bilaterally.  Has full strength into flx and ext without pain.  Instructed Mr. Silva to ease back into his walking program.  Has an excellent prognosis to return to prior level of function.  Prognosis: good    Goals  Plan Goals: STGs:  1.)  LEFS  improved x 1 MCID in 2 weeks.  LTGs  1.)  Return to premorbid full walking program without pain or functional limitation in 4 weeks.    Plan  Therapy options: will be seen for skilled physical therapy services  Planned therapy interventions: therapeutic activities, manual therapy, home exercise program, functional ROM exercises, strengthening and stretching  Frequency: 1x week  Duration in visits: 4  Duration in weeks: 4  Treatment plan discussed with: patient       TE billed based on extensive written and verbal instruction on returning to walking program on treadmill and use of elliptical program.    Manual Therapy:    15     mins  81565;  Therapeutic Exercise:    10     mins  01843;     Neuromuscular Alyson:        mins  85721;    Therapeutic Activity:          mins  20865;     Gait Training:           mins  94482;     Ultrasound:          mins  57737;    Electrical Stimulation:         mins  60857 ( );  Dry Needling          mins self-pay    Timed Treatment:   25   mins   Total Treatment:     50   mins    PT SIGNATURE: Jg Pinto PT   DATE TREATMENT INITIATED: 10/15/2020    Initial Certification  Certification Period: 1/13/2021  I certify that the therapy services are furnished while this patient is under my care.  The services outlined above are required by this patient, and will be reviewed every 90 days.     PHYSICIAN: Xu Garza MD      DATE:     Please sign and return via fax to [phone].. Thank you, [facility].

## 2021-02-15 ENCOUNTER — TELEPHONE (OUTPATIENT)
Dept: INTERNAL MEDICINE | Facility: CLINIC | Age: 56
End: 2021-02-15

## 2021-02-15 NOTE — TELEPHONE ENCOUNTER
MEGHAN FROM [employer] CALLED REQUESTING MEDICAL RECORDS    SHE IS REQUESTING MEDICAL RECORDS FROM THE PAST FIVE YEARS     FAX NUMBER: [phone]  CASE NUMBER: [id number]    PHONE NUMBER: [phone]    SHE STATED THIS IS AN IMPORTANT REQUEST AND REQUESTED IT BE SENT BACK AS HIGH PRIORITY

## 2021-02-17 NOTE — TELEPHONE ENCOUNTER
Deandra from [employer] requested a call back. Deandra stated she faxed a medical record request on 2/11/21 and on 2/15/21. Deandra would like to make sure this was received as this is a high priority case for her.    Please call and advise. Deandra's call back [phone]

## 2021-03-01 NOTE — TELEPHONE ENCOUNTER
Rach from [facility] retrievals requested a call back. Rach stated she sent a medical records request on 2/24/21 and she would like to know if this was received and if the records will be sent to them.    Please call and advise. Rach's call back [phone] extension [phone]  fax number [phone]

## 2021-03-03 NOTE — TELEPHONE ENCOUNTER
Talked to them on 3/2/2021.  They were going to fax a new release due to the suite number not being on the release.  Waiting on the release then will send to [facility].  -TRANG

## 2021-03-09 NOTE — TELEPHONE ENCOUNTER
[facility] RENATO ([facility]) WAS CONTACTING TO CHECK ON THE RECORD REQUEST FOR THE PATIENT'S PAST 5 YEARS. SHE STATES THAT THEY SEND OVER A FAX ON THIS MULTIPLE TIMES. SHE WAS INFORMED BY HAMLET THAT IT WAS RECEIVED ON 02/15/21. PLEASE ADVISE.     CONTACT: [phone] EXT [phone]

## 2021-04-14 ENCOUNTER — OFFICE VISIT (OUTPATIENT)
Dept: INTERNAL MEDICINE | Facility: CLINIC | Age: 56
End: 2021-04-14

## 2021-04-14 VITALS
BODY MASS INDEX: 39.14 KG/M2 | DIASTOLIC BLOOD PRESSURE: 82 MMHG | WEIGHT: 289 LBS | HEART RATE: 82 BPM | TEMPERATURE: 97.5 F | HEIGHT: 72 IN | SYSTOLIC BLOOD PRESSURE: 130 MMHG | OXYGEN SATURATION: 98 %

## 2021-04-14 DIAGNOSIS — I10 ESSENTIAL HYPERTENSION: ICD-10-CM

## 2021-04-14 DIAGNOSIS — Z00.00 ANNUAL PHYSICAL EXAM: Primary | ICD-10-CM

## 2021-04-14 DIAGNOSIS — G47.30 SLEEP APNEA, UNSPECIFIED TYPE: ICD-10-CM

## 2021-04-14 DIAGNOSIS — E66.9 CLASS 2 OBESITY WITHOUT SERIOUS COMORBIDITY WITH BODY MASS INDEX (BMI) OF 35.0 TO 35.9 IN ADULT, UNSPECIFIED OBESITY TYPE: ICD-10-CM

## 2021-04-14 PROCEDURE — 99214 OFFICE O/P EST MOD 30 MIN: CPT | Performed by: INTERNAL MEDICINE

## 2021-04-14 PROCEDURE — 99396 PREV VISIT EST AGE 40-64: CPT | Performed by: INTERNAL MEDICINE

## 2021-04-14 RX ORDER — AMLODIPINE BESYLATE AND BENAZEPRIL HYDROCHLORIDE 10; 40 MG/1; MG/1
1 CAPSULE ORAL DAILY
Qty: 90 CAPSULE | Refills: 3 | Status: SHIPPED | OUTPATIENT
Start: 2021-04-14 | End: 2022-04-18 | Stop reason: SDUPTHER

## 2021-04-14 RX ORDER — FLUCONAZOLE 150 MG/1
150 TABLET ORAL ONCE
Qty: 2 TABLET | Refills: 0 | Status: SHIPPED | OUTPATIENT
Start: 2021-04-14 | End: 2021-04-14

## 2021-04-14 NOTE — PROGRESS NOTES
Subjective   Samantha Silva is a 55 y.o. male and is here for a comprehensive physical exam.     Patient here for physical also has medical problems to be addressed.  The patient complains upper body round discolored lesion all over some scaly and itchy.  Patient states his daughters have similar problems and he received Diflucan from dermatologist improved.  Patient again gained 9 pound.  BMI now 39.2.Blood pressure stable on medication.  Knee joint pain improved after physical therapy.    Do you take any herbs or supplements that were not prescribed by a doctor? no  Are you taking calcium supplements? no  Are you taking aspirin daily? no      The following portions of the patient's history were reviewed and updated as appropriate: allergies, current medications, past family history, past medical history, past social history, past surgical history and problem list.      Review of Systems   Constitutional: Negative.    HENT: Negative.    Eyes: Negative.    Respiratory: Negative.    Cardiovascular: Negative.    Gastrointestinal: Negative.    Endocrine: Negative.    Genitourinary: Negative.    Musculoskeletal: Negative.    Skin: Negative.    Allergic/Immunologic: Negative.    Neurological: Negative.    Hematological: Negative.    Psychiatric/Behavioral: Negative.    All other systems reviewed and are negative.        Physical Exam  Constitutional:       Appearance: Normal appearance. He is well-developed. He is obese.   HENT:      Head: Normocephalic.      Right Ear: Tympanic membrane, ear canal and external ear normal.      Left Ear: Tympanic membrane, ear canal and external ear normal.      Nose: Nose normal.      Mouth/Throat:      Mouth: Mucous membranes are moist.      Pharynx: Oropharynx is clear.   Eyes:      Extraocular Movements: Extraocular movements intact.      Conjunctiva/sclera: Conjunctivae normal.      Pupils: Pupils are equal, round, and reactive to light.   Cardiovascular:      Rate and Rhythm:  Normal rate and regular rhythm.      Pulses: Normal pulses.      Heart sounds: Normal heart sounds.   Pulmonary:      Effort: Pulmonary effort is normal.      Breath sounds: Normal breath sounds.   Abdominal:      General: Bowel sounds are normal.      Palpations: Abdomen is soft.   Genitourinary:     Penis: Normal.       Testes: Normal.      Prostate: Normal.      Rectum: Normal.   Musculoskeletal:         General: Normal range of motion.      Cervical back: Normal range of motion and neck supple.   Skin:     General: Skin is warm.   Neurological:      General: No focal deficit present.      Mental Status: He is alert and oriented to person, place, and time.   Psychiatric:         Mood and Affect: Mood normal.         Behavior: Behavior normal.         Thought Content: Thought content normal.         Judgment: Judgment normal.         All  tests have been reviewed.    Assessment/Plan          1. Patient Counseling:  --Nutrition: Stressed importance of moderation in sodium/caffeine intake, saturated fat and cholesterol, caloric balance, sufficient intake of fresh fruits, vegetables, fiber, calcium and iron.  --Exercise: Stressed the importance of regular exercise.   --Injury prevention: Discussed safety belts, safety helmets, smoke detector, smoking near bedding or upholstery.   --Dental health: Discussed importance of regular tooth brushing, flossing, and dental visits.  --Immunizations reviewed.  --Discussed benefits of screening colonoscopy.  --After hours service discussed with patient    2. Discussed the patient's BMI with him.            Essential hypertension continue medication    Sleep apnea, unspecified type continue CPAP     Class 2 obesity BMI 39.2.  Counseling for weight loss today.    Tinea versicolor initiate Diflucan     Vitamin D deficiency  -     Vitamin D 25 Hydroxy     Hemorrhoids, cont medication    Knee OA stable encourage patient to avoid treadmill instead stationary bike and  elliptical    Annual phys    Do lab                 ----Below is to historical records for reference only:    Colon 8/28/19 repeat 3 years  Sleep apnea on cpap   Hx of Lumbar disc herniation doing well  vitD low,  do lab  [id number], tdap due

## 2021-10-20 ENCOUNTER — TELEPHONE (OUTPATIENT)
Dept: INTERNAL MEDICINE | Facility: CLINIC | Age: 56
End: 2021-10-20

## 2021-10-20 NOTE — TELEPHONE ENCOUNTER
Patient did not complete XRAY KNEE ordered on 10/06/2020. This order is too old to be used. May I cancel the order?

## 2022-04-18 ENCOUNTER — OFFICE VISIT (OUTPATIENT)
Dept: INTERNAL MEDICINE | Facility: CLINIC | Age: 57
End: 2022-04-18

## 2022-04-18 VITALS
TEMPERATURE: 97.3 F | BODY MASS INDEX: 36.44 KG/M2 | SYSTOLIC BLOOD PRESSURE: 142 MMHG | HEART RATE: 75 BPM | DIASTOLIC BLOOD PRESSURE: 90 MMHG | WEIGHT: 269 LBS | OXYGEN SATURATION: 96 % | HEIGHT: 72 IN

## 2022-04-18 DIAGNOSIS — B35.1 ONYCHOMYCOSIS: ICD-10-CM

## 2022-04-18 DIAGNOSIS — D12.4 ADENOMATOUS POLYP OF DESCENDING COLON: ICD-10-CM

## 2022-04-18 DIAGNOSIS — H93.13 TINNITUS OF BOTH EARS: ICD-10-CM

## 2022-04-18 DIAGNOSIS — G47.30 SLEEP APNEA, UNSPECIFIED TYPE: ICD-10-CM

## 2022-04-18 DIAGNOSIS — I10 ESSENTIAL HYPERTENSION: Primary | ICD-10-CM

## 2022-04-18 DIAGNOSIS — B36.0 TINEA VERSICOLOR: ICD-10-CM

## 2022-04-18 DIAGNOSIS — E66.9 CLASS 2 OBESITY WITHOUT SERIOUS COMORBIDITY WITH BODY MASS INDEX (BMI) OF 36.0 TO 36.9 IN ADULT, UNSPECIFIED OBESITY TYPE: ICD-10-CM

## 2022-04-18 DIAGNOSIS — Z00.00 ANNUAL PHYSICAL EXAM: ICD-10-CM

## 2022-04-18 PROCEDURE — 90715 TDAP VACCINE 7 YRS/> IM: CPT | Performed by: INTERNAL MEDICINE

## 2022-04-18 PROCEDURE — 99396 PREV VISIT EST AGE 40-64: CPT | Performed by: INTERNAL MEDICINE

## 2022-04-18 PROCEDURE — 90471 IMMUNIZATION ADMIN: CPT | Performed by: INTERNAL MEDICINE

## 2022-04-18 PROCEDURE — 99213 OFFICE O/P EST LOW 20 MIN: CPT | Performed by: INTERNAL MEDICINE

## 2022-04-18 RX ORDER — FLUCONAZOLE 100 MG/1
100 TABLET ORAL DAILY
Status: CANCELLED | OUTPATIENT
Start: 2022-04-18

## 2022-04-18 RX ORDER — AMLODIPINE BESYLATE AND BENAZEPRIL HYDROCHLORIDE 10; 40 MG/1; MG/1
1 CAPSULE ORAL DAILY
Qty: 90 CAPSULE | Refills: 3 | Status: SHIPPED | OUTPATIENT
Start: 2022-04-18

## 2022-04-18 RX ORDER — PRENATAL VIT 91/IRON/FOLIC/DHA 28-975-200
1 COMBINATION PACKAGE (EA) ORAL 2 TIMES DAILY
Qty: 42 G | Refills: 1 | Status: SHIPPED | OUTPATIENT
Start: 2022-04-18 | End: 2022-06-27

## 2022-04-18 NOTE — PROGRESS NOTES
Subjective   Samantha Silva is a 56 y.o. male and is here for a comprehensive physical exam.     Do you take any herbs or supplements that were not prescribed by a doctor? no  Are you taking calcium supplements? no  Are you taking aspirin daily? no    Mr. Silva is a 56-year-old male who presents today for annual physical. Patient has a past medical history of hypertension that is currently being controlled on Lotrel and BP in office is elevated at 142/90. Patient was educated about the importance of a low salt diet and good diet. Patient has a past medical history of tinea versicolor and is wanting medication. Patient has a past medical history of obesity and his current BMI is 36.5. Patient was educated on good diet control. Patient is due for colonoscopy in August 2022 and ambulatory referral to [facility] is needed. Patient's hemorrhoids has improved and is not on current medication. Patient sleep apnea has improved since being on CPAP.    Patient states that he has noticed over the past year that he has developed ringing of the ears bilaterally. Patient states that he does not want to see an ENT specialist and wants to continue to monitor. Patient states that he has shotguns and been around loud noises his whole life. Patient denies loss of hearing, dizziness, loss of balance, ear pain, and ear drainage.    The following portions of the patient's history were reviewed and updated as appropriate: allergies, current medications, past family history, past medical history, past social history, past surgical history and problem list.      Review of Systems   Constitutional: Negative.  Negative for chills and fever.   HENT: Positive for tinnitus (bilateral). Negative for congestion, ear discharge, ear pain, hearing loss and rhinorrhea.    Eyes: Negative.    Respiratory: Negative.    Cardiovascular: Negative.  Negative for chest pain, palpitations and leg swelling.   Gastrointestinal: Negative.  Negative for abdominal  distention and abdominal pain.   Endocrine: Negative.    Genitourinary: Negative.  Negative for difficulty urinating, frequency, hematuria and urgency.   Musculoskeletal: Negative.  Negative for arthralgias, back pain and joint swelling.   Skin: Positive for rash (Tinea versicolor).   Allergic/Immunologic: Negative.    Neurological: Negative.  Negative for dizziness, syncope, numbness and headaches.   Hematological: Negative.    Psychiatric/Behavioral: Negative.  Negative for agitation, dysphoric mood and suicidal ideas.   All other systems reviewed and are negative.        Physical Exam  Constitutional:       General: He is not in acute distress.     Appearance: He is obese.   HENT:      Head: Normocephalic and atraumatic.      Right Ear: Tympanic membrane, ear canal and external ear normal.      Left Ear: Tympanic membrane, ear canal and external ear normal.      Mouth/Throat:      Pharynx: Oropharynx is clear. No oropharyngeal exudate or posterior oropharyngeal erythema.   Eyes:      Extraocular Movements: Extraocular movements intact.      Pupils: Pupils are equal, round, and reactive to light.   Cardiovascular:      Rate and Rhythm: Normal rate and regular rhythm.      Pulses: Normal pulses.      Heart sounds: Normal heart sounds. No murmur heard.    No gallop.   Pulmonary:      Effort: Pulmonary effort is normal.      Breath sounds: Normal breath sounds.   Abdominal:      General: Abdomen is flat. Bowel sounds are normal. There is no distension.      Palpations: Abdomen is soft.      Tenderness: There is no abdominal tenderness.   Genitourinary:     Prostate: Normal.      Rectum: Normal.      Comments: Prostate exam done and was normal    Musculoskeletal:         General: No swelling, tenderness or deformity. Normal range of motion.      Cervical back: Neck supple.      Right lower leg: No edema.      Left lower leg: No edema.   Skin:     General: Skin is warm.      Capillary Refill: Capillary refill takes  less than 2 seconds.      Findings: Rash (Tinea versicolor rash) present. No erythema.   Neurological:      General: No focal deficit present.      Mental Status: He is alert and oriented to person, place, and time. Mental status is at baseline.   Psychiatric:         Mood and Affect: Mood normal.         Behavior: Behavior normal.         Thought Content: Thought content normal.         All  tests have been reviewed.    Assessment/Plan          1. Patient Counseling:  --Nutrition: Stressed importance of moderation in sodium/caffeine intake, saturated fat and cholesterol, caloric balance, sufficient intake of fresh fruits, vegetables, fiber, calcium and iron.  --Exercise: Stressed the importance of regular exercise.   --Injury prevention: Discussed safety belts, safety helmets, smoke detector, smoking near bedding or upholstery.   --Dental health: Discussed importance of regular tooth brushing, flossing, and dental visits.  --Immunizations reviewed.  --Discussed benefits of screening colonoscopy.  --After hours service discussed with patient    2. Discussed the patient's BMI with him.      Diagnoses and all orders for this visit:    Essential hypertension, patient educated on good diet and low salt diet. Patient instructed to monitor BP at home. If high then call  - BP in office 142/90  -    Stay on amLODIPine-benazepril (LOTREL) 10-40 MG per capsule; Take 1 capsule by mouth Daily.    Class 2 obesity without serious comorbidity with body mass index (BMI) of 36.0 to 36.9 in adult, unspecified obesity type, discussed diet control. Patient has gained weight since last visit.     Sleep apnea, unspecified type, stable on CPAP    Tinnitus Bilateral, no hearing loss. cont to monitor. Patient does not want to see ENT.     Hemorrhoids, improved.     Tinea versicolor initiate terbinafine      Adenomatous polyp of descending colon  - ambulatory referral to gastroenterology in 8/2022    Onychomycosis, cont to monitor    Annual  Physical   - check CBC  - check CMP  - check PSA  - check Hep C Antibody  - check lipid panel  - check TSH  - check Vitamin D     Other orders  -     Tdap Vaccine Greater Than or Equal To 6yo IM    Annual phys        Below is to historical records for reference only:    Colon 8/28/19 repeat 3 years  Sleep apnea on cpap   Hx of Lumbar disc herniation doing well  vitD low,  do lab  [id number], tdap do on 4/18   annual physical

## 2022-04-19 LAB
25(OH)D3+25(OH)D2 SERPL-MCNC: 27.4 NG/ML (ref 30–100)
ALBUMIN SERPL-MCNC: 4.3 G/DL (ref 3.5–5.2)
ALBUMIN/GLOB SERPL: 1.7 G/DL
ALP SERPL-CCNC: 81 U/L (ref 39–117)
ALT SERPL-CCNC: 31 U/L (ref 1–41)
AST SERPL-CCNC: 18 U/L (ref 1–40)
BASOPHILS # BLD AUTO: 0.05 10*3/MM3 (ref 0–0.2)
BASOPHILS NFR BLD AUTO: 0.9 % (ref 0–1.5)
BILIRUB SERPL-MCNC: 0.3 MG/DL (ref 0–1.2)
BUN SERPL-MCNC: 12 MG/DL (ref 6–20)
BUN/CREAT SERPL: 10.7 (ref 7–25)
CALCIUM SERPL-MCNC: 9.6 MG/DL (ref 8.6–10.5)
CHLORIDE SERPL-SCNC: 106 MMOL/L (ref 98–107)
CHOLEST SERPL-MCNC: 179 MG/DL (ref 0–200)
CO2 SERPL-SCNC: 26.3 MMOL/L (ref 22–29)
CREAT SERPL-MCNC: 1.12 MG/DL (ref 0.76–1.27)
EGFRCR SERPLBLD CKD-EPI 2021: 77.6 ML/MIN/1.73
EOSINOPHIL # BLD AUTO: 0.12 10*3/MM3 (ref 0–0.4)
EOSINOPHIL NFR BLD AUTO: 2.2 % (ref 0.3–6.2)
ERYTHROCYTE [DISTWIDTH] IN BLOOD BY AUTOMATED COUNT: 12.6 % (ref 12.3–15.4)
GLOBULIN SER CALC-MCNC: 2.6 GM/DL
GLUCOSE SERPL-MCNC: 90 MG/DL (ref 65–99)
HCT VFR BLD AUTO: 47.4 % (ref 37.5–51)
HCV AB S/CO SERPL IA: 0.2 S/CO RATIO (ref 0–0.9)
HDLC SERPL-MCNC: 34 MG/DL (ref 40–60)
HGB BLD-MCNC: 15.6 G/DL (ref 13–17.7)
IMM GRANULOCYTES # BLD AUTO: 0.02 10*3/MM3 (ref 0–0.05)
IMM GRANULOCYTES NFR BLD AUTO: 0.4 % (ref 0–0.5)
LDLC SERPL CALC-MCNC: 119 MG/DL (ref 0–100)
LYMPHOCYTES # BLD AUTO: 1.29 10*3/MM3 (ref 0.7–3.1)
LYMPHOCYTES NFR BLD AUTO: 24.1 % (ref 19.6–45.3)
MCH RBC QN AUTO: 30.7 PG (ref 26.6–33)
MCHC RBC AUTO-ENTMCNC: 32.9 G/DL (ref 31.5–35.7)
MCV RBC AUTO: 93.3 FL (ref 79–97)
MONOCYTES # BLD AUTO: 0.49 10*3/MM3 (ref 0.1–0.9)
MONOCYTES NFR BLD AUTO: 9.1 % (ref 5–12)
NEUTROPHILS # BLD AUTO: 3.39 10*3/MM3 (ref 1.7–7)
NEUTROPHILS NFR BLD AUTO: 63.3 % (ref 42.7–76)
NRBC BLD AUTO-RTO: 0 /100 WBC (ref 0–0.2)
PLATELET # BLD AUTO: 257 10*3/MM3 (ref 140–450)
POTASSIUM SERPL-SCNC: 4.8 MMOL/L (ref 3.5–5.2)
PROT SERPL-MCNC: 6.9 G/DL (ref 6–8.5)
PSA SERPL-MCNC: 0.83 NG/ML (ref 0–4)
RBC # BLD AUTO: 5.08 10*6/MM3 (ref 4.14–5.8)
SODIUM SERPL-SCNC: 141 MMOL/L (ref 136–145)
TRIGL SERPL-MCNC: 144 MG/DL (ref 0–150)
TSH SERPL DL<=0.005 MIU/L-ACNC: 2.24 UIU/ML (ref 0.27–4.2)
VLDLC SERPL CALC-MCNC: 26 MG/DL (ref 5–40)
WBC # BLD AUTO: 5.36 10*3/MM3 (ref 3.4–10.8)

## 2022-06-27 ENCOUNTER — OFFICE VISIT (OUTPATIENT)
Dept: GASTROENTEROLOGY | Facility: CLINIC | Age: 57
End: 2022-06-27

## 2022-06-27 VITALS
WEIGHT: 285 LBS | RESPIRATION RATE: 16 BRPM | BODY MASS INDEX: 38.6 KG/M2 | HEIGHT: 72 IN | HEART RATE: 85 BPM | SYSTOLIC BLOOD PRESSURE: 154 MMHG | TEMPERATURE: 98 F | DIASTOLIC BLOOD PRESSURE: 91 MMHG

## 2022-06-27 DIAGNOSIS — Z86.010 PERSONAL HISTORY OF COLONIC POLYPS: Primary | ICD-10-CM

## 2022-06-27 DIAGNOSIS — E66.01 CLASS 2 SEVERE OBESITY DUE TO EXCESS CALORIES WITH SERIOUS COMORBIDITY IN ADULT, UNSPECIFIED BMI: ICD-10-CM

## 2022-06-27 DIAGNOSIS — Z98.84 HISTORY OF LAPAROSCOPIC ADJUSTABLE GASTRIC BANDING: ICD-10-CM

## 2022-06-27 PROCEDURE — 99214 OFFICE O/P EST MOD 30 MIN: CPT | Performed by: INTERNAL MEDICINE

## 2022-06-27 RX ORDER — SODIUM, POTASSIUM,MAG SULFATES 17.5-3.13G
2 SOLUTION, RECONSTITUTED, ORAL ORAL ONCE
Qty: 354 ML | Refills: 0 | Status: SHIPPED | OUTPATIENT
Start: 2022-06-27 | End: 2022-06-27

## 2022-06-27 RX ORDER — SODIUM CHLORIDE 9 MG/ML
70 INJECTION, SOLUTION INTRAVENOUS CONTINUOUS PRN
Status: CANCELLED | OUTPATIENT
Start: 2022-06-27

## 2022-06-27 NOTE — PROGRESS NOTES
Follow Up Note     Date: 2022   Patient Name: Samantha Silva  MRN: [id number]  : 1965     Referring Physician: Xu Garza MD    Chief Complaint:    Chief Complaint   Patient presents with   • Follow-up   • Colon Polyps       Interval History:   2022  Samantha Silva is a 57 y.o. male who is here today for follow up for scheduling surveillance colonoscopy.   His last colonoscopy was in 2019 and had a few polyps removed 2 of them tubular adenomas and rest of them were hyperplastic polyp.  However as per report there was a 4 mm polyp in the transverse colon which missed the view and could not be located gain.  Dr. Rodriguez recommended to repeat the colonoscopy in 3 years time.  He deny any abdominal pain, change in bowel habit, hematochezia or melena.  Weight is stable. Pt denies nausea vomiting or odynophagia or dysphagia. There is no history of acid reflux. There is no history of anemia. No prior history No family history of colon cancer or any GI malignancy.  He had a lab work done about 12 years ago.  Denies alcohol abuse or cigarette smoking.     2019  The patient denies recent change in bowel habits. There is no diarrhea or constipation. There is no history of abdominal pain. There is no history of overt GI bleed (hematemesis melena or hematochezia). The patient denies nausea or vomiting. There is no history of reflux. The patient denies dysphagia or odynophagia. There is no history of recent significant weight loss. There is no history of liver disease in the past. There is no family history of colon cancer. The patient has not had a colonoscopy in the past.  Subjective      Past Medical History:   Past Medical History:   Diagnosis Date   • CPAP (continuous positive airway pressure) dependence    • History of echocardiogram    • History of stress test    • Hypertension    • Obesity    • Sleep apnea    • Wears glasses      Past Surgical History:   Past Surgical History:    Procedure Laterality Date   • BARIATRIC SURGERY  2015   • CARDIAC CATHETERIZATION  2011    x 2   • COLONOSCOPY N/A 2019    Procedure: COLONOSCOPY WITH COLD FORCEP POLYPECTOMY;  Surgeon: Дмитрий Jasso MD;  Location: [facility] ENDOSCOPY;  Service: Gastroenterology   • ENDOSCOPY     • FLEXIBLE SIGMOIDOSCOPY     • LAPAROSCOPIC GASTRIC BANDING     • SPINE SURGERY      L4-L5       Family History:   Family History   Problem Relation Age of Onset   • Heart attack Brother    • Colon cancer Neg Hx    • Liver disease Neg Hx        Social History:   Social History     Socioeconomic History   • Marital status:    Tobacco Use   • Smoking status: Former Smoker     Packs/day: 2.00     Years: 4.00     Pack years: 8.00     Types: Cigarettes     Start date: 1987     Quit date: 1991     Years since quittin.5   • Smokeless tobacco: Never Used   Vaping Use   • Vaping Use: Never used   Substance and Sexual Activity   • Alcohol use: Yes     Alcohol/week: 2.0 standard drinks     Types: 2 Cans of beer per week     Comment: Per Month   • Drug use: No   • Sexual activity: Defer       Medications:     Current Outpatient Medications:   •  amLODIPine-benazepril (LOTREL) 10-40 MG per capsule, Take 1 capsule by mouth Daily., Disp: 90 capsule, Rfl: 3  •  terbinafine (LamISIL AT) 1 % cream, Apply 1 application topically to the appropriate area as directed 2 (Two) Times a Day., Disp: 42 g, Rfl: 1    Allergies:   No Known Allergies    Review of Systems:   Review of Systems   Constitutional: Negative for appetite change, fatigue, fever and unexpected weight loss.   HENT: Negative for trouble swallowing.    Gastrointestinal: Negative for abdominal distention, abdominal pain, anal bleeding, blood in stool, constipation, diarrhea, nausea, rectal pain, vomiting, GERD and indigestion.       The following portions of the patient's history were reviewed and updated as appropriate: allergies, current medications, past family  "history, past medical history, past social history, past surgical history and problem list.    Objective     Physical Exam:  Vital Signs:   Vitals:    06/27/22 1628   BP: 154/91   Pulse: 85   Resp: 16   Temp: 98 °F (36.7 °C)   TempSrc: Infrared   Weight: 129 kg (285 lb)   Height: 182.9 cm (72\")       Physical Exam  Constitutional:       Appearance: He is obese.   HENT:      Head: Normocephalic and atraumatic.   Eyes:      Conjunctiva/sclera: Conjunctivae normal.   Abdominal:      General: Abdomen is flat. There is no distension.      Palpations: There is no mass.      Tenderness: There is no abdominal tenderness. There is no guarding or rebound.      Hernia: No hernia is present.   Musculoskeletal:      Cervical back: Normal range of motion and neck supple.   Neurological:      Mental Status: He is alert.         Results Review:   I reviewed the patient's new clinical results.    Office Visit on 04/18/2022   Component Date Value Ref Range Status   • WBC 04/18/2022 5.36  3.40 - 10.80 10*3/mm3 Final   • RBC 04/18/2022 5.08  4.14 - 5.80 10*6/mm3 Final   • Hemoglobin 04/18/2022 15.6  13.0 - 17.7 g/dL Final   • Hematocrit 04/18/2022 47.4  37.5 - 51.0 % Final   • MCV 04/18/2022 93.3  79.0 - 97.0 fL Final   • MCH 04/18/2022 30.7  26.6 - 33.0 pg Final   • MCHC 04/18/2022 32.9  31.5 - 35.7 g/dL Final   • RDW 04/18/2022 12.6  12.3 - 15.4 % Final   • Platelets 04/18/2022 257  140 - 450 10*3/mm3 Final   • Neutrophil Rel % 04/18/2022 63.3  42.7 - 76.0 % Final   • Lymphocyte Rel % 04/18/2022 24.1  19.6 - 45.3 % Final   • Monocyte Rel % 04/18/2022 9.1  5.0 - 12.0 % Final   • Eosinophil Rel % 04/18/2022 2.2  0.3 - 6.2 % Final   • Basophil Rel % 04/18/2022 0.9  0.0 - 1.5 % Final   • Neutrophils Absolute 04/18/2022 3.39  1.70 - 7.00 10*3/mm3 Final   • Lymphocytes Absolute 04/18/2022 1.29  0.70 - 3.10 10*3/mm3 Final   • Monocytes Absolute 04/18/2022 0.49  0.10 - 0.90 10*3/mm3 Final   • Eosinophils Absolute 04/18/2022 0.12  0.00 - 0.40 " 10*3/mm3 Final   • Basophils Absolute 04/18/2022 0.05  0.00 - 0.20 10*3/mm3 Final   • Immature Granulocyte Rel % 04/18/2022 0.4  0.0 - 0.5 % Final   • Immature Grans Absolute 04/18/2022 0.02  0.00 - 0.05 10*3/mm3 Final   • nRBC 04/18/2022 0.0  0.0 - 0.2 /100 WBC Final   • Glucose 04/18/2022 90  65 - 99 mg/dL Final   • BUN 04/18/2022 12  6 - 20 mg/dL Final   • Creatinine 04/18/2022 1.12  0.76 - 1.27 mg/dL Final   • EGFR Result 04/18/2022 77.6  >60.0 mL/min/1.73 Final    Comment: National Kidney Foundation and American Society of  Nephrology (ASN) Task Force recommended calculation based  on the Chronic Kidney Disease Epidemiology Collaboration  (CKD-EPI) equation refit without adjustment for race.  GFR Normal >60  Chronic Kidney Disease <60  Kidney Failure <15     • BUN/Creatinine Ratio 04/18/2022 10.7  7.0 - 25.0 Final   • Sodium 04/18/2022 141  136 - 145 mmol/L Final   • Potassium 04/18/2022 4.8  3.5 - 5.2 mmol/L Final   • Chloride 04/18/2022 106  98 - 107 mmol/L Final   • Total CO2 04/18/2022 26.3  22.0 - 29.0 mmol/L Final   • Calcium 04/18/2022 9.6  8.6 - 10.5 mg/dL Final   • Total Protein 04/18/2022 6.9  6.0 - 8.5 g/dL Final   • Albumin 04/18/2022 4.30  3.50 - 5.20 g/dL Final   • Globulin 04/18/2022 2.6  gm/dL Final   • A/G Ratio 04/18/2022 1.7  g/dL Final   • Total Bilirubin 04/18/2022 0.3  0.0 - 1.2 mg/dL Final   • Alkaline Phosphatase 04/18/2022 81  39 - 117 U/L Final   • AST (SGOT) 04/18/2022 18  1 - 40 U/L Final   • ALT (SGPT) 04/18/2022 31  1 - 41 U/L Final   • Total Cholesterol 04/18/2022 179  0 - 200 mg/dL Final    Comment: Cholesterol Reference Ranges  (U.S. Department of Health and Human Services ATP III  Classifications)  Desirable          <200 mg/dL  Borderline High    200-239 mg/dL  High Risk          >240 mg/dL  Triglyceride Reference Ranges  (U.S. Department of Health and Human Services ATP III  Classifications)  Normal           <150 mg/dL  Borderline High  150-199 mg/dL  High              200-499 mg/dL  Very High        >500 mg/dL  HDL Reference Ranges  (U.S. Department of Health and Human Services ATP III  Classifications)  Low     <40 mg/dl (major risk factor for CHD)  High    >60 mg/dl ('negative' risk factor for CHD)  LDL Reference Ranges  (U.S. Department of Health and Human Services ATP III  Classifications)  Optimal          <100 mg/dL  Near Optimal     100-129 mg/dL  Borderline High  130-159 mg/dL  High             160-189 mg/dL  Very High        >189 mg/dL     • Triglycerides 04/18/2022 144  0 - 150 mg/dL Final   • HDL Cholesterol 04/18/2022 34 (A) 40 - 60 mg/dL Final   • VLDL Cholesterol Miguel Angel 04/18/2022 26  5 - 40 mg/dL Final   • LDL Chol Calc (NIH) 04/18/2022 119 (A) 0 - 100 mg/dL Final   • TSH 04/18/2022 2.240  0.270 - 4.200 uIU/mL Final   • PSA 04/18/2022 0.826  0.000 - 4.000 ng/mL Final    Results may be falsely decreased if patient taking Biotin.   • Hep C Virus Ab 04/18/2022 0.2  0.0 - 0.9 s/co ratio Final    Comment:                                   Negative:     < 0.8                               Indeterminate: 0.8 - 0.9                                    Positive:     > 0.9   The CDC recommends that a positive HCV antibody result   be followed up with a HCV Nucleic Acid Amplification   test (179199).     • 25 Hydroxy, Vitamin D 04/18/2022 27.4 (A) 30.0 - 100.0 ng/ml Final    Comment: Results may be falsely increased if patient taking Biotin.  Reference Range for Total Vitamin D 25(OH)  Deficiency <20.0 ng/mL  Insufficiency 21-29 ng/mL  Sufficiency  ng/mL  Toxicity >100 ng/ml        No radiology results for the last 90 days.    Assessment / Plan      1.  Personal history of adenomatous colon polyps  His last colonoscopy was in 2019 and had a multiple colon polyps removed.  2 of the polyps were tubular adenoma without any dysplasia and rest of the polyps were hyperplastic.  As per Dr. Rodriguez's note there was a polyp in the transverse colon measuring about 4 mm which he could  locate the polyp again and was not removed.  He recommended to repeat the colonoscopy in 3 years time.    Will schedule surveillance colonoscopy now  The indications, technique, alternatives and potential risk and complications were discussed with the patient including but not limited to bleeding, bowel perforations, missing lesions and anesthetic complications. The patient understands and wishes to proceed with the procedure and has given their verbal consent. Written patient education information was given to the patient.   The patient will call if they have further questions before procedure.     Suprep bowel prep    2.  Obesity with a BMI 38.  Recent lab work done in April 2022 reviewed and does not reveal any elevated liver enzymes.  No signs of any cirrhosis.  Discussed on the lifestyle changes and dietary changes and importance of losing weight to prevent the development of fatty liver disease.    3.  History of gastric lap band surgery  No current symptoms.  It did not work for him no significant weight loss after the gastric lap band.    Follow Up:   No follow-ups on file.    Maria M Olvera MD  [facility] [place]  6/27/2022  16:30 EDT     Please note that portions of this note may have been completed with a voice recognition program.

## 2022-06-28 PROBLEM — Z86.010 PERSONAL HISTORY OF COLONIC POLYPS: Status: ACTIVE | Noted: 2022-06-28

## 2022-09-07 ENCOUNTER — TELEPHONE (OUTPATIENT)
Dept: GASTROENTEROLOGY | Facility: CLINIC | Age: 57
End: 2022-09-07

## 2022-09-07 NOTE — TELEPHONE ENCOUNTER
I called [facility] for peer to peer for this patient's colonoscopy scheduled 9/20/2022. After reaching a representative, I was informed that this procedure actually met criteria and did not need a peer to peer. She updated this in the system for me after information regarding past procedure findings and recommendations were given. Scheduled colonoscopy has been approved.     Auth # [id number]   valid thru 12/6/2022

## 2022-11-08 DIAGNOSIS — Z86.010 PERSONAL HISTORY OF COLONIC POLYPS: Primary | ICD-10-CM

## 2022-11-08 RX ORDER — SODIUM CHLORIDE 9 MG/ML
30 INJECTION, SOLUTION INTRAVENOUS CONTINUOUS PRN
Status: CANCELLED | OUTPATIENT
Start: 2022-11-08

## 2023-05-09 DIAGNOSIS — I10 ESSENTIAL HYPERTENSION: ICD-10-CM

## 2023-05-09 RX ORDER — AMLODIPINE BESYLATE AND BENAZEPRIL HYDROCHLORIDE 10; 40 MG/1; MG/1
CAPSULE ORAL
Qty: 90 CAPSULE | Refills: 1 | Status: SHIPPED | OUTPATIENT
Start: 2023-05-09

## 2023-05-09 NOTE — TELEPHONE ENCOUNTER
Rx Refill Note  Requested Prescriptions     Pending Prescriptions Disp Refills   • amLODIPine-benazepril (LOTREL) 10-40 MG per capsule [Pharmacy Med Name: amLODIPine Besy-Benazepril HCl Oral Capsule 10-40 MG] 90 capsule 0     Sig: TAKE 1 CAPSULE BY MOUTH EVERY DAY      Last office visit with prescribing clinician: 4/18/2022  Next office visit with prescribing clinician: Visit date not found    Called pt no answer, left vm per verbal about making annual apt      Fallon Jacobson MA  05/09/23, 11:30 EDT

## 2023-05-12 DIAGNOSIS — I10 ESSENTIAL HYPERTENSION: ICD-10-CM

## 2023-05-12 RX ORDER — AMLODIPINE BESYLATE AND BENAZEPRIL HYDROCHLORIDE 10; 40 MG/1; MG/1
1 CAPSULE ORAL DAILY
Qty: 90 CAPSULE | Refills: 1 | OUTPATIENT
Start: 2023-05-12

## 2023-05-12 NOTE — TELEPHONE ENCOUNTER
Caller: Ricardo, [address]    Relationship: Self    Best call back number: [phone]    Requested Prescriptions:   Requested Prescriptions     Pending Prescriptions Disp Refills   • amLODIPine-benazepril (LOTREL) 10-40 MG per capsule 90 capsule 1     Sig: Take 1 capsule by mouth Daily.        Pharmacy where request should be sent: [id number], [place] - [address] - [phone] [facility] [phone] FX     Last office visit with prescribing clinician: 4/18/2022   Last telemedicine visit with prescribing clinician: 5/9/2023   Next office visit with prescribing clinician: 5/23/2023     Additional details provided by patient:PATIENT HAS AN APPOINTMENT ON 5/23 AND WOULD LIKE A MEDICATION REFILL TO LAST HIM UNTIL HIS APPOINTMENT     Does the patient have less than a 3 day supply:  [] Yes  [x] No        Kanika Sinclair Rep   05/12/23 13:15 EDT

## 2023-05-23 ENCOUNTER — OFFICE VISIT (OUTPATIENT)
Dept: INTERNAL MEDICINE | Facility: CLINIC | Age: 58
End: 2023-05-23
Payer: COMMERCIAL

## 2023-05-23 VITALS
BODY MASS INDEX: 38.74 KG/M2 | TEMPERATURE: 97.4 F | HEIGHT: 72 IN | HEART RATE: 83 BPM | OXYGEN SATURATION: 96 % | WEIGHT: 286 LBS | SYSTOLIC BLOOD PRESSURE: 130 MMHG | DIASTOLIC BLOOD PRESSURE: 74 MMHG

## 2023-05-23 DIAGNOSIS — F41.8 ANXIETY WITH DEPRESSION: Primary | ICD-10-CM

## 2023-05-23 DIAGNOSIS — Z00.00 ANNUAL PHYSICAL EXAM: ICD-10-CM

## 2023-05-23 NOTE — PROGRESS NOTES
Subjective   Samantha Silva is a 58 y.o. male.     Chief Complaint   Patient presents with   • Hypertension   • Med Refill       History of Present Illness   Patient here for depression anxiety marital stress recently  in need of medication denies any suicidal thoughts.  Blood pressure stable on medication.  Weight is still very high.  Tinea versicolor resolved after medication patient yet to do colonoscopy    Current Outpatient Medications:   •  amLODIPine-benazepril (LOTREL) 10-40 MG per capsule, TAKE 1 CAPSULE BY MOUTH EVERY DAY, Disp: 90 capsule, Rfl: 1  •  sertraline (Zoloft) 50 MG tablet, Take 1 tablet by mouth Daily., Disp: 30 tablet, Rfl: 1    The following portions of the patient's history were reviewed and updated as appropriate: allergies, current medications, past family history, past medical history, past social history, past surgical history and problem list.    Review of Systems   Constitutional: Negative.    Respiratory: Negative.    Cardiovascular: Negative.    Gastrointestinal: Negative.    Musculoskeletal: Negative.    Skin: Negative.    Neurological: Negative.    Psychiatric/Behavioral: Negative.        Objective   Physical Exam  Cardiovascular:      Rate and Rhythm: Normal rate and regular rhythm.      Heart sounds: Normal heart sounds.   Pulmonary:      Effort: Pulmonary effort is normal.      Breath sounds: Normal breath sounds.   Abdominal:      General: Bowel sounds are normal.   Musculoskeletal:      Cervical back: Neck supple.   Skin:     General: Skin is warm.   Neurological:      Mental Status: He is alert and oriented to person, place, and time.   Psychiatric:         Behavior: Behavior normal.         All tests have been reviewed.    Assessment & Plan   Diagnoses and all orders for this visit:    Anxiety with depression initiate medication counseling today  -     sertraline (Zoloft) 50 MG tablet; Take 1 tablet by mouth Daily.    Annual physical exam next time  -     CBC &  Differential  -     Comprehensive Metabolic Panel  -     TSH  -     PSA DIAGNOSTIC  -     Lipid Panel  -     Vitamin D,25-Hydroxy    Essential hypertension, stable on med cont      Class 2 obesity without serious comorbidity with body mass index (BMI) of 36.0 to 36.9 in adult, unspecified obesity type, discussed diet control. Patient has gained weight since last visit.      Tinnitus Bilateral, no hearing loss. cont to monitor. Patient does not want to see ENT.      Tinea versicolor initiate terbinafine improved     Anxiety depression start zoloft 50      Adenomatous polyp of descending colon  Patient to find doctor his ins covers and let us know        1 month physical after blood tests

## 2023-06-15 ENCOUNTER — OFFICE VISIT (OUTPATIENT)
Dept: INTERNAL MEDICINE | Facility: CLINIC | Age: 58
End: 2023-06-15
Payer: COMMERCIAL

## 2023-06-15 VITALS
OXYGEN SATURATION: 95 % | SYSTOLIC BLOOD PRESSURE: 128 MMHG | HEART RATE: 86 BPM | WEIGHT: 282 LBS | HEIGHT: 72 IN | BODY MASS INDEX: 38.19 KG/M2 | DIASTOLIC BLOOD PRESSURE: 82 MMHG | TEMPERATURE: 97.2 F

## 2023-06-15 DIAGNOSIS — G47.30 SLEEP APNEA, UNSPECIFIED TYPE: ICD-10-CM

## 2023-06-15 DIAGNOSIS — I10 ESSENTIAL HYPERTENSION: ICD-10-CM

## 2023-06-15 DIAGNOSIS — E66.9 OBESITY (BMI 35.0-39.9 WITHOUT COMORBIDITY): Primary | ICD-10-CM

## 2023-06-15 DIAGNOSIS — F41.8 ANXIETY WITH DEPRESSION: ICD-10-CM

## 2023-06-15 DIAGNOSIS — Z12.11 COLON CANCER SCREENING: ICD-10-CM

## 2023-06-15 RX ORDER — SEMAGLUTIDE 0.5 MG/.5ML
0.5 INJECTION, SOLUTION SUBCUTANEOUS
Qty: 2 ML | Refills: 2 | Status: SHIPPED | OUTPATIENT
Start: 2023-06-15

## 2023-06-15 RX ORDER — SEMAGLUTIDE 0.5 MG/.5ML
0.5 INJECTION, SOLUTION SUBCUTANEOUS
Qty: 2 ML | Refills: 2 | Status: SHIPPED | OUTPATIENT
Start: 2023-06-15 | End: 2023-06-15 | Stop reason: SDUPTHER

## 2023-06-15 NOTE — PROGRESS NOTES
Subjective   Samantha Silva is a 58 y.o. male and is here for a comprehensive physical exam.     Do you take any herbs or supplements that were not prescribed by a doctor? no  Are you taking calcium supplements? no  Are you taking aspirin daily? no      The following portions of the patient's history were reviewed and updated as appropriate: allergies, current medications, past family history, past medical history, past social history, past surgical history, and problem list.    Patient Active Problem List   Diagnosis    Essential hypertension    Class 2 obesity without serious comorbidity with body mass index (BMI) of 36.0 to 36.9 in adult    Sleep apnea    Tinnitus of both ears    Tinea versicolor    Onychomycosis    Adenomatous polyp of descending colon    Personal history of colonic polyps    Anxiety with depression       Review of Systems   Constitutional: Negative.    HENT: Negative.     Eyes: Negative.    Respiratory: Negative.     Cardiovascular: Negative.    Gastrointestinal: Negative.    Endocrine: Negative.    Genitourinary: Negative.    Musculoskeletal: Negative.    Skin: Negative.    Allergic/Immunologic: Negative.    Neurological: Negative.    Hematological: Negative.    Psychiatric/Behavioral: Negative.     All other systems reviewed and are negative.    Physical Exam  Vitals and nursing note reviewed.   Constitutional:       Appearance: Normal appearance. He is well-developed. He is obese.   HENT:      Head: Normocephalic and atraumatic.      Right Ear: Tympanic membrane, ear canal and external ear normal.      Left Ear: Tympanic membrane, ear canal and external ear normal.      Nose: Nose normal.   Eyes:      Conjunctiva/sclera: Conjunctivae normal.      Pupils: Pupils are equal, round, and reactive to light.   Neck:      Thyroid: No thyromegaly.   Cardiovascular:      Rate and Rhythm: Normal rate and regular rhythm.      Heart sounds: Normal heart sounds.   Pulmonary:      Effort: Pulmonary  effort is normal.      Breath sounds: Normal breath sounds.   Abdominal:      General: Bowel sounds are normal.      Palpations: Abdomen is soft.   Musculoskeletal:         General: Normal range of motion.      Cervical back: Normal range of motion and neck supple.   Skin:     General: Skin is warm and dry.   Neurological:      General: No focal deficit present.      Mental Status: He is alert and oriented to person, place, and time.      Deep Tendon Reflexes: Reflexes are normal and symmetric.   Psychiatric:         Mood and Affect: Mood normal.         Behavior: Behavior normal.         Thought Content: Thought content normal.         Judgment: Judgment normal.       All  tests have been reviewed.    Assessment & Plan          1. Patient Counseling:  --Nutrition: Stressed importance of moderation in sodium/caffeine intake, saturated fat and cholesterol, caloric balance, sufficient intake of fresh fruits, vegetables, fiber, calcium and iron.  --Exercise: Stressed the importance of regular exercise.   --Injury prevention: Discussed safety belts, safety helmets, smoke detector, smoking near bedding or upholstery.   --Dental health: Discussed importance of regular tooth brushing, flossing, and dental visits.  --Immunizations reviewed.  --Discussed benefits of screening colonoscopy.  --After hours service discussed with patient    2. Discussed the patient's BMI with him.               Assessment & Plan   There are no diagnoses linked to this encounter.    Anxiety with depression improved after med below cont   -     sertraline (Zoloft) 50 MG tablet; Take 1 tablet by mouth Daily.     Annual physical exam next time  -     CBC & Differential  -     Comprehensive Metabolic Panel  -     TSH  -     PSA DIAGNOSTIC  -     Lipid Panel  -     Vitamin D,25-Hydroxy     Essential hypertension, stable on med cont      Class 2 obesity without serious comorbidity with body mass index (BMI) of 36.0 to 36.9 in adult, unspecified obesity  type, discussed diet control. Patient has gained weight since last visit.  start wegovy      Tinnitus Bilateral, no hearing loss. cont to monitor. Patient does not want to see ENT.      Tinea versicolor terbinafine improved      Adenomatous polyp of descending colon ref to GI

## 2023-11-16 DIAGNOSIS — I10 ESSENTIAL HYPERTENSION: ICD-10-CM

## 2023-11-27 RX ORDER — AMLODIPINE AND BENAZEPRIL HYDROCHLORIDE 10; 40 MG/1; MG/1
CAPSULE ORAL
Qty: 90 CAPSULE | Refills: 0 | OUTPATIENT
Start: 2023-11-27

## 2024-01-19 ENCOUNTER — HOSPITAL ENCOUNTER (OUTPATIENT)
Dept: GENERAL RADIOLOGY | Facility: HOSPITAL | Age: 59
Discharge: HOME | End: 2024-01-19
Admitting: NURSE PRACTITIONER
Payer: COMMERCIAL

## 2024-01-19 ENCOUNTER — TRANSCRIBE ORDERS (OUTPATIENT)
Dept: ADMINISTRATIVE | Facility: HOSPITAL | Age: 59
End: 2024-01-19
Payer: COMMERCIAL

## 2024-01-19 DIAGNOSIS — J18.0 LOBULAR PNEUMONIA: Primary | ICD-10-CM

## 2024-01-19 DIAGNOSIS — J18.0 LOBULAR PNEUMONIA: ICD-10-CM

## 2024-01-19 PROCEDURE — 71046 X-RAY EXAM CHEST 2 VIEWS: CPT

## (undated) DEVICE — ENDOSCOPY PORT CONNECTOR FOR OLYMPUS® SCOPES: Brand: ERBE

## (undated) DEVICE — HYBRID TUBING/CAP SET FOR OLYMPUS® SCOPES: Brand: ERBE

## (undated) DEVICE — PAD GRND REM POLYHESIVE A/ DISP

## (undated) DEVICE — FRCP BIOP COLD ENDOJAW ALLGTR W/NDL 2.8X2300MM BLU

## (undated) DEVICE — Device: Brand: DEFENDO AIR/WATER/SUCTION AND BIOPSY VALVE

## (undated) DEVICE — LUBE JELLY PK/2.75GM STRL BX/144

## (undated) DEVICE — Device